# Patient Record
Sex: MALE | Race: BLACK OR AFRICAN AMERICAN | Employment: UNEMPLOYED | URBAN - METROPOLITAN AREA
[De-identification: names, ages, dates, MRNs, and addresses within clinical notes are randomized per-mention and may not be internally consistent; named-entity substitution may affect disease eponyms.]

---

## 2017-01-23 ENCOUNTER — HOSPITAL ENCOUNTER (OUTPATIENT)
Dept: LAB | Age: 62
Discharge: HOME | End: 2017-01-23

## 2017-01-26 RX ORDER — AMLODIPINE AND VALSARTAN 10; 320 MG/1; MG/1
1 TABLET ORAL DAILY
COMMUNITY

## 2017-01-26 RX ORDER — ASPIRIN 81 MG/1
81 TABLET ORAL DAILY
COMMUNITY

## 2017-01-26 RX ORDER — ALBUTEROL SULFATE 90 UG/1
2 AEROSOL, METERED RESPIRATORY (INHALATION)
COMMUNITY

## 2017-01-26 RX ORDER — COLCHICINE 0.6 MG/1
0.6 TABLET ORAL DAILY
COMMUNITY

## 2017-01-26 RX ORDER — WARFARIN SODIUM 5 MG/1
5 TABLET ORAL DAILY
COMMUNITY

## 2017-01-26 RX ORDER — BISOPROLOL FUMARATE 10 MG/1
10 TABLET ORAL
COMMUNITY

## 2017-01-26 RX ORDER — ROSUVASTATIN CALCIUM 10 MG/1
10 TABLET, COATED ORAL
COMMUNITY

## 2017-01-26 RX ORDER — HYDROCHLOROTHIAZIDE 25 MG/1
25 TABLET ORAL DAILY
COMMUNITY

## 2017-01-26 RX ORDER — MOMETASONE FUROATE 50 UG/1
2 SPRAY, METERED NASAL AS NEEDED
COMMUNITY

## 2017-01-31 ENCOUNTER — ANESTHESIA EVENT (OUTPATIENT)
Dept: SURGERY | Age: 62
End: 2017-01-31
Payer: MEDICARE

## 2017-02-01 ENCOUNTER — ANESTHESIA (OUTPATIENT)
Dept: SURGERY | Age: 62
End: 2017-02-01
Payer: MEDICARE

## 2017-02-01 ENCOUNTER — HOSPITAL ENCOUNTER (OUTPATIENT)
Age: 62
Setting detail: OUTPATIENT SURGERY
Discharge: HOME | End: 2017-02-01
Attending: UROLOGY | Admitting: UROLOGY
Payer: MEDICARE

## 2017-02-01 VITALS
WEIGHT: 290 LBS | RESPIRATION RATE: 15 BRPM | HEART RATE: 75 BPM | DIASTOLIC BLOOD PRESSURE: 82 MMHG | TEMPERATURE: 98.7 F | BODY MASS INDEX: 36.06 KG/M2 | SYSTOLIC BLOOD PRESSURE: 123 MMHG | OXYGEN SATURATION: 99 % | HEIGHT: 75 IN

## 2017-02-01 LAB
BUN BLD-MCNC: 18 MG/DL (ref 7–18)
CHLORIDE BLD-SCNC: 104 MMOL/L (ref 100–108)
GLUCOSE BLD STRIP.AUTO-MCNC: 89 MG/DL (ref 74–106)
HCT VFR BLD CALC: 45 % (ref 36–49)
HGB BLD-MCNC: 15.3 G/DL (ref 12–16)
POTASSIUM BLD-SCNC: 4.5 MMOL/L (ref 3.5–5.5)
SODIUM BLD-SCNC: 139 MMOL/L (ref 136–145)

## 2017-02-01 PROCEDURE — 82947 ASSAY GLUCOSE BLOOD QUANT: CPT

## 2017-02-01 PROCEDURE — 74011250636 HC RX REV CODE- 250/636

## 2017-02-01 PROCEDURE — 74011250636 HC RX REV CODE- 250/636: Performed by: NURSE ANESTHETIST, CERTIFIED REGISTERED

## 2017-02-01 PROCEDURE — 74011250637 HC RX REV CODE- 250/637

## 2017-02-01 PROCEDURE — 77030012510 HC MSK AIRWY LMA TELE -B: Performed by: NURSE ANESTHETIST, CERTIFIED REGISTERED

## 2017-02-01 RX ORDER — FAMOTIDINE 20 MG/1
20 TABLET, FILM COATED ORAL ONCE
Status: DISCONTINUED | OUTPATIENT
Start: 2017-02-02 | End: 2017-02-01 | Stop reason: HOSPADM

## 2017-02-01 RX ORDER — INSULIN LISPRO 100 [IU]/ML
INJECTION, SOLUTION INTRAVENOUS; SUBCUTANEOUS ONCE
Status: DISCONTINUED | OUTPATIENT
Start: 2017-02-02 | End: 2017-02-01 | Stop reason: HOSPADM

## 2017-02-01 RX ORDER — FAMOTIDINE 20 MG/1
TABLET, FILM COATED ORAL
Status: COMPLETED
Start: 2017-02-01 | End: 2017-02-01

## 2017-02-01 RX ORDER — SODIUM CHLORIDE, SODIUM LACTATE, POTASSIUM CHLORIDE, CALCIUM CHLORIDE 600; 310; 30; 20 MG/100ML; MG/100ML; MG/100ML; MG/100ML
25 INJECTION, SOLUTION INTRAVENOUS CONTINUOUS
Status: DISCONTINUED | OUTPATIENT
Start: 2017-02-02 | End: 2017-02-01 | Stop reason: HOSPADM

## 2017-02-01 RX ORDER — SODIUM CHLORIDE 0.9 % (FLUSH) 0.9 %
5-10 SYRINGE (ML) INJECTION AS NEEDED
Status: DISCONTINUED | OUTPATIENT
Start: 2017-02-01 | End: 2017-02-01 | Stop reason: HOSPADM

## 2017-02-01 RX ORDER — SODIUM CHLORIDE 0.9 % (FLUSH) 0.9 %
5-10 SYRINGE (ML) INJECTION EVERY 8 HOURS
Status: DISCONTINUED | OUTPATIENT
Start: 2017-02-01 | End: 2017-02-01 | Stop reason: HOSPADM

## 2017-02-01 RX ORDER — CEFAZOLIN SODIUM 2 G/50ML
2 SOLUTION INTRAVENOUS
Status: DISCONTINUED | OUTPATIENT
Start: 2017-02-01 | End: 2017-02-01 | Stop reason: HOSPADM

## 2017-02-01 RX ADMIN — SODIUM CHLORIDE, SODIUM LACTATE, POTASSIUM CHLORIDE, AND CALCIUM CHLORIDE 25 ML/HR: 600; 310; 30; 20 INJECTION, SOLUTION INTRAVENOUS at 08:38

## 2017-02-01 RX ADMIN — FAMOTIDINE 20 MG: 20 TABLET ORAL at 08:44

## 2017-02-01 NOTE — PROGRESS NOTES
Progress Note:    Pt has only been off his Warfarin and ASA for 2 days. With this procedure he is at risk for periurethral and perineal bleeding. At this time we are postponing his procedure until Monday at 130 pm.  He and his family are upset but understand the situation.     Karina Guerra MD

## 2017-02-03 ENCOUNTER — ANESTHESIA EVENT (OUTPATIENT)
Dept: SURGERY | Age: 62
End: 2017-02-03
Payer: MEDICARE

## 2017-02-06 ENCOUNTER — ANESTHESIA (OUTPATIENT)
Dept: SURGERY | Age: 62
End: 2017-02-06
Payer: MEDICARE

## 2017-02-06 ENCOUNTER — SURGERY (OUTPATIENT)
Age: 62
End: 2017-02-06

## 2017-02-06 ENCOUNTER — APPOINTMENT (OUTPATIENT)
Dept: GENERAL RADIOLOGY | Age: 62
End: 2017-02-06
Attending: UROLOGY
Payer: MEDICARE

## 2017-02-06 ENCOUNTER — HOSPITAL ENCOUNTER (OUTPATIENT)
Age: 62
Setting detail: OUTPATIENT SURGERY
Discharge: HOME | End: 2017-02-06
Attending: UROLOGY | Admitting: UROLOGY
Payer: MEDICARE

## 2017-02-06 VITALS
HEIGHT: 75 IN | HEART RATE: 66 BPM | OXYGEN SATURATION: 100 % | WEIGHT: 290 LBS | DIASTOLIC BLOOD PRESSURE: 91 MMHG | SYSTOLIC BLOOD PRESSURE: 130 MMHG | BODY MASS INDEX: 36.06 KG/M2 | RESPIRATION RATE: 13 BRPM | TEMPERATURE: 97.6 F

## 2017-02-06 PROBLEM — C61 PROSTATE CANCER (HCC): Status: ACTIVE | Noted: 2017-02-06

## 2017-02-06 PROBLEM — M19.90 OSTEOARTHRITIS: Status: ACTIVE | Noted: 2017-02-06

## 2017-02-06 PROBLEM — I50.9 CHF (CONGESTIVE HEART FAILURE) (HCC): Status: ACTIVE | Noted: 2017-02-06

## 2017-02-06 PROBLEM — K63.5 COLON POLYP: Status: ACTIVE | Noted: 2017-02-06

## 2017-02-06 PROBLEM — I48.91 A-FIB (HCC): Status: ACTIVE | Noted: 2017-02-06

## 2017-02-06 LAB
ATRIAL RATE: 113 BPM
CALCULATED R AXIS, ECG10: 8 DEGREES
CALCULATED T AXIS, ECG11: -7 DEGREES
DIAGNOSIS, 93000: NORMAL
Q-T INTERVAL, ECG07: 392 MS
QRS DURATION, ECG06: 86 MS
QTC CALCULATION (BEZET), ECG08: 443 MS
VENTRICULAR RATE, ECG03: 77 BPM

## 2017-02-06 PROCEDURE — 74011000250 HC RX REV CODE- 250: Performed by: UROLOGY

## 2017-02-06 PROCEDURE — 93005 ELECTROCARDIOGRAM TRACING: CPT

## 2017-02-06 PROCEDURE — 74011250636 HC RX REV CODE- 250/636

## 2017-02-06 PROCEDURE — 76060000032 HC ANESTHESIA 0.5 TO 1 HR: Performed by: UROLOGY

## 2017-02-06 PROCEDURE — 77030020269 HC MISC IMPL: Performed by: UROLOGY

## 2017-02-06 PROCEDURE — 74011250636 HC RX REV CODE- 250/636: Performed by: UROLOGY

## 2017-02-06 PROCEDURE — 74011250637 HC RX REV CODE- 250/637: Performed by: NURSE ANESTHETIST, CERTIFIED REGISTERED

## 2017-02-06 PROCEDURE — 74011250636 HC RX REV CODE- 250/636: Performed by: NURSE ANESTHETIST, CERTIFIED REGISTERED

## 2017-02-06 PROCEDURE — 76010000138 HC OR TIME 0.5 TO 1 HR: Performed by: UROLOGY

## 2017-02-06 PROCEDURE — 77030012510 HC MSK AIRWY LMA TELE -B: Performed by: ANESTHESIOLOGY

## 2017-02-06 PROCEDURE — 74011000250 HC RX REV CODE- 250

## 2017-02-06 PROCEDURE — A4648 IMPLANTABLE TISSUE MARKER: HCPCS | Performed by: UROLOGY

## 2017-02-06 PROCEDURE — 77030032490 HC SLV COMPR SCD KNE COVD -B: Performed by: UROLOGY

## 2017-02-06 PROCEDURE — 76210000006 HC OR PH I REC 0.5 TO 1 HR: Performed by: UROLOGY

## 2017-02-06 PROCEDURE — 77030018846 HC SOL IRR STRL H20 ICUM -A: Performed by: UROLOGY

## 2017-02-06 PROCEDURE — 76210000020 HC REC RM PH II FIRST 0.5 HR: Performed by: UROLOGY

## 2017-02-06 PROCEDURE — A4649 SURGICAL SUPPLIES: HCPCS | Performed by: UROLOGY

## 2017-02-06 DEVICE — SPACER RECTAL HYDRGEL 10ML -- SPACEOAR: Type: IMPLANTABLE DEVICE | Site: PERINEUM | Status: FUNCTIONAL

## 2017-02-06 RX ORDER — DOCUSATE SODIUM 100 MG/1
100 CAPSULE, LIQUID FILLED ORAL 2 TIMES DAILY
Qty: 60 CAP | Refills: 0 | Status: SHIPPED | OUTPATIENT
Start: 2017-02-06 | End: 2017-03-08

## 2017-02-06 RX ORDER — CEPHALEXIN 500 MG/1
500 CAPSULE ORAL 3 TIMES DAILY
Qty: 9 CAP | Refills: 0 | Status: SHIPPED | OUTPATIENT
Start: 2017-02-06 | End: 2017-02-09

## 2017-02-06 RX ORDER — MIDAZOLAM HYDROCHLORIDE 1 MG/ML
INJECTION, SOLUTION INTRAMUSCULAR; INTRAVENOUS AS NEEDED
Status: DISCONTINUED | OUTPATIENT
Start: 2017-02-06 | End: 2017-02-06 | Stop reason: HOSPADM

## 2017-02-06 RX ORDER — FENTANYL CITRATE 50 UG/ML
25 INJECTION, SOLUTION INTRAMUSCULAR; INTRAVENOUS AS NEEDED
Status: DISCONTINUED | OUTPATIENT
Start: 2017-02-06 | End: 2017-02-06 | Stop reason: HOSPADM

## 2017-02-06 RX ORDER — FAMOTIDINE 20 MG/1
20 TABLET, FILM COATED ORAL ONCE
Status: COMPLETED | OUTPATIENT
Start: 2017-02-06 | End: 2017-02-06

## 2017-02-06 RX ORDER — FENTANYL CITRATE 50 UG/ML
INJECTION, SOLUTION INTRAMUSCULAR; INTRAVENOUS AS NEEDED
Status: DISCONTINUED | OUTPATIENT
Start: 2017-02-06 | End: 2017-02-06 | Stop reason: HOSPADM

## 2017-02-06 RX ORDER — HYDROMORPHONE HYDROCHLORIDE 2 MG/ML
0.5 INJECTION, SOLUTION INTRAMUSCULAR; INTRAVENOUS; SUBCUTANEOUS
Status: DISCONTINUED | OUTPATIENT
Start: 2017-02-06 | End: 2017-02-06 | Stop reason: HOSPADM

## 2017-02-06 RX ORDER — SODIUM CHLORIDE, SODIUM LACTATE, POTASSIUM CHLORIDE, CALCIUM CHLORIDE 600; 310; 30; 20 MG/100ML; MG/100ML; MG/100ML; MG/100ML
100 INJECTION, SOLUTION INTRAVENOUS CONTINUOUS
Status: DISCONTINUED | OUTPATIENT
Start: 2017-02-06 | End: 2017-02-06 | Stop reason: HOSPADM

## 2017-02-06 RX ORDER — PROPOFOL 10 MG/ML
INJECTION, EMULSION INTRAVENOUS AS NEEDED
Status: DISCONTINUED | OUTPATIENT
Start: 2017-02-06 | End: 2017-02-06 | Stop reason: HOSPADM

## 2017-02-06 RX ORDER — LIDOCAINE HYDROCHLORIDE 10 MG/ML
INJECTION INFILTRATION; PERINEURAL AS NEEDED
Status: DISCONTINUED | OUTPATIENT
Start: 2017-02-06 | End: 2017-02-06 | Stop reason: HOSPADM

## 2017-02-06 RX ORDER — ONDANSETRON 2 MG/ML
INJECTION INTRAMUSCULAR; INTRAVENOUS AS NEEDED
Status: DISCONTINUED | OUTPATIENT
Start: 2017-02-06 | End: 2017-02-06 | Stop reason: HOSPADM

## 2017-02-06 RX ORDER — SODIUM CHLORIDE, SODIUM LACTATE, POTASSIUM CHLORIDE, CALCIUM CHLORIDE 600; 310; 30; 20 MG/100ML; MG/100ML; MG/100ML; MG/100ML
25 INJECTION, SOLUTION INTRAVENOUS CONTINUOUS
Status: DISCONTINUED | OUTPATIENT
Start: 2017-02-06 | End: 2017-02-06 | Stop reason: HOSPADM

## 2017-02-06 RX ORDER — CEFAZOLIN SODIUM 2 G/50ML
2 SOLUTION INTRAVENOUS
Status: COMPLETED | OUTPATIENT
Start: 2017-02-06 | End: 2017-02-06

## 2017-02-06 RX ORDER — GLYCOPYRROLATE 0.2 MG/ML
INJECTION INTRAMUSCULAR; INTRAVENOUS AS NEEDED
Status: DISCONTINUED | OUTPATIENT
Start: 2017-02-06 | End: 2017-02-06 | Stop reason: HOSPADM

## 2017-02-06 RX ORDER — ALBUTEROL SULFATE 0.83 MG/ML
2.5 SOLUTION RESPIRATORY (INHALATION)
Status: DISCONTINUED | OUTPATIENT
Start: 2017-02-06 | End: 2017-02-06 | Stop reason: HOSPADM

## 2017-02-06 RX ORDER — LIDOCAINE HYDROCHLORIDE 20 MG/ML
INJECTION, SOLUTION EPIDURAL; INFILTRATION; INTRACAUDAL; PERINEURAL AS NEEDED
Status: DISCONTINUED | OUTPATIENT
Start: 2017-02-06 | End: 2017-02-06 | Stop reason: HOSPADM

## 2017-02-06 RX ORDER — OXYCODONE AND ACETAMINOPHEN 5; 325 MG/1; MG/1
1-2 TABLET ORAL
Qty: 8 TAB | Refills: 0 | Status: SHIPPED | OUTPATIENT
Start: 2017-02-06

## 2017-02-06 RX ADMIN — SODIUM CHLORIDE, SODIUM LACTATE, POTASSIUM CHLORIDE, AND CALCIUM CHLORIDE 25 ML/HR: 600; 310; 30; 20 INJECTION, SOLUTION INTRAVENOUS at 11:52

## 2017-02-06 RX ADMIN — PROPOFOL 160 MG: 10 INJECTION, EMULSION INTRAVENOUS at 13:31

## 2017-02-06 RX ADMIN — MIDAZOLAM HYDROCHLORIDE 2 MG: 1 INJECTION, SOLUTION INTRAMUSCULAR; INTRAVENOUS at 13:23

## 2017-02-06 RX ADMIN — LIDOCAINE HYDROCHLORIDE 10 ML: 10 INJECTION, SOLUTION INFILTRATION; PERINEURAL at 13:48

## 2017-02-06 RX ADMIN — FENTANYL CITRATE 75 MCG: 50 INJECTION, SOLUTION INTRAMUSCULAR; INTRAVENOUS at 13:30

## 2017-02-06 RX ADMIN — PROPOFOL 40 MG: 10 INJECTION, EMULSION INTRAVENOUS at 13:42

## 2017-02-06 RX ADMIN — ONDANSETRON 4 MG: 2 INJECTION INTRAMUSCULAR; INTRAVENOUS at 13:23

## 2017-02-06 RX ADMIN — CEFAZOLIN SODIUM 2 G: 2 SOLUTION INTRAVENOUS at 13:30

## 2017-02-06 RX ADMIN — FAMOTIDINE 20 MG: 20 TABLET ORAL at 11:08

## 2017-02-06 RX ADMIN — FENTANYL CITRATE 25 MCG: 50 INJECTION, SOLUTION INTRAMUSCULAR; INTRAVENOUS at 13:26

## 2017-02-06 RX ADMIN — LIDOCAINE HYDROCHLORIDE 60 MG: 20 INJECTION, SOLUTION EPIDURAL; INFILTRATION; INTRACAUDAL; PERINEURAL at 13:31

## 2017-02-06 RX ADMIN — GLYCOPYRROLATE 0.2 MG: 0.2 INJECTION INTRAMUSCULAR; INTRAVENOUS at 13:23

## 2017-02-06 NOTE — ANESTHESIA PREPROCEDURE EVALUATION
Anesthetic History   No history of anesthetic complications            Review of Systems / Medical History  Patient summary reviewed and pertinent labs reviewed    Pulmonary        Sleep apnea: CPAP  Shortness of breath  Asthma        Neuro/Psych   Within defined limits           Cardiovascular    Hypertension        Dysrhythmias : atrial fibrillation      Exercise tolerance: >4 METS     GI/Hepatic/Renal  Within defined limits              Endo/Other        Arthritis     Other Findings   Comments: Current Smoker? NO       Elective Surgery? Yes       Abstained from smoking 24 hours prior to anesthesia? N/A    Risk Factors for Postoperative nausea/vomiting:       History of postoperative nausea/vomiting? NO       Female? NO       Motion sickness? NO       Intended opioid administration for postoperative analgesia?   YES           Physical Exam    Airway  Mallampati: II  TM Distance: 4 - 6 cm  Neck ROM: normal range of motion   Mouth opening: Normal     Cardiovascular    Rhythm: irregular  Rate: normal         Dental  No notable dental hx       Pulmonary  Breath sounds clear to auscultation               Abdominal  GI exam deferred       Other Findings            Anesthetic Plan    ASA: 3  Anesthesia type: general          Induction: Intravenous  Anesthetic plan and risks discussed with: Patient

## 2017-02-06 NOTE — BRIEF OP NOTE
BRIEF OPERATIVE NOTE    Date of Procedure: 2/6/2017   Preoperative Diagnosis: c61 prostate cancer  Postoperative Diagnosis: c61 prostate cancer    Procedure(s): FIDUCIAL seed  MARKERs with space OAR gel placement  Surgeon(s) and Role:     * Jessica Mathew MD - Primary            Surgical Staff:  Circ-1: Lincoln Bar RN  Radiology Technician: Sedrick Arriola  Scrub Tech-1: Amber Cordova  Event Time In   Incision Start  1:40 PM   Incision Close  1:59 PM     Anesthesia: General   Estimated Blood Loss: minimal  Specimens: none  Findings: medium prostate implantedwith 3 fiducial seed markers in usual triangular fashion. SpaceOAR implanted in midline with good rectal separation from prostate. Complications: none  Implants: 3 Polycarbon fiducial seed markers implanted. 10cc of SpaceOAR implanted.     Jessica Mathew MD

## 2017-02-06 NOTE — H&P
History and Physical        Patient: Walter Hamilton               Sex: male          DOA: (Not on file)         YOB: 1955      Age:  59 y.o.        LOS:  LOS: 0 days        HPI:     Walter Hamilton is a 59 y.o. male who is admitted today for Fiducial seed marker implant and SpaceOAR hydrogel implant. He was found to have Laurel's 7 (4+3) in 6 of 12 cores of a biopsy This was done as his PSA was 7.7. He has elected to proceed with Proton Therapy and now presents for above. He has been on Coumadin and ASA for his Afib and has been off this for at least 4 days. Past Medical History   Diagnosis Date    Adverse effect of anesthesia 2008     LUNG COLLAPSED POST KNEE SX    Arthritis      FEET & HANDS    Asthma     Atrial fibrillation ([address]     Cancer [address] [address]      PROSTATE    Hypertension     Sleep apnea      CPAP    Vertigo    Prostate cancer    Past Surgical History   Procedure Laterality Date    Hx knee replacement Bilateral     Hx urological       PROSTATE BX    Hx colonoscopy         History reviewed. No pertinent family history. Social History     Social History    Marital status:      Spouse name: N/A    Number of children: N/A    Years of education: N/A     Social History Main Topics    Smoking status: Never Smoker    Smokeless tobacco: Never Used    Alcohol use Yes      Comment: occasionally    Drug use: No    Sexual activity: Not Asked     Other Topics Concern    None     Social History Narrative       Prior to Admission medications    Medication Sig Start Date End Date Taking? Authorizing Provider   warfarin (COUMADIN) 5 mg tablet Take 5 mg by mouth daily. Yes Historical Provider   aspirin delayed-release 81 mg tablet Take 81 mg by mouth daily. Yes Historical Provider   amLODIPine-valsartan (EXFORGE)  mg per tablet Take 1 Tab by mouth daily. Yes Historical Provider   bisoprolol (ZEBETA) 10 mg tablet Take 10 mg by mouth nightly.    Yes Historical Provider   rosuvastatin (CRESTOR) 10 mg tablet Take 10 mg by mouth nightly. Yes Historical Provider   mometasone (NASONEX) 50 mcg/actuation nasal spray 2 Sprays as needed. Yes Historical Provider   hydroCHLOROthiazide (HYDRODIURIL) 25 mg tablet Take 25 mg by mouth daily. Yes Historical Provider   albuterol (PROAIR HFA) 90 mcg/actuation inhaler Take 2 Puffs by inhalation every four (4) hours as needed for Wheezing. Yes Historical Provider   mometasone-formoterol (DULERA) 100-5 mcg/actuation HFA inhaler Take 2 Puffs by inhalation two (2) times a day. Yes Historical Provider   CETIRIZINE HCL (CETIRIZINE PO) Take  by mouth. Yes Historical Provider   colchicine 0.6 mg tablet Take 0.6 mg by mouth daily. Yes Historical Provider       No Known Allergies    Review of Systems    Constitutional: Fever: no  Skin: Rash: no  HEENT: Hearing difficulty: no  Eyes: Blurred vision: no  Cardiovascular: Chest pain: no  Respiratory: Shortness of breath: no  Gastrointestinal: Nausea/vomiting: no  Musculoskeletal: Back pain: no  Neurological: Weakness: no  Psychological: Memory loss: no  Comments/additional findings:       Physical Exam:      Visit Vitals    Ht 6' 2.5\" (1.892 m)    Wt 290 lb (131.5 kg)    BMI 36.74 kg/m2       Physical Exam:  Visit Vitals    Ht 6' 2.5\" (1.892 m)    Wt 290 lb (131.5 kg)    BMI 36.74 kg/m2     Constitutional: WDWN, Pleasant and appropriate affect, No acute distress. CV:  No peripheral swelling noted, regularly irregular  Respiratory: No respiratory distress or difficulties, CTAB. Abdomen:  No abdominal masses or tenderness. No CVA tenderness. No hernias noted.  Male:    SARA:Perineum normal to visual inspection, no erythema or irritation, Sphincter with good tone, Rectum with no hemorrhoids, fissures or masses. Prostate is moderate  SCROTUM:  No scrotal rash or lesions noticed. Normal bilateral testes and epididymis. PENIS: Urethral meatus normal in location and size.  No urethral discharge. Skin: No jaundice. Neuro/Psych:  Alert and oriented x 3. Affect appropriate. Lymphatic:   No enlarged inguinal lymph nodes. Labs Reviewed:    Results for orders placed or performed during the hospital encounter of 02/01/17   POC 6 PLUS   Result Value Ref Range    Sodium (POC) 139 136 - 145 MMOL/L    Potassium (POC) 4.5 3.5 - 5.5 MMOL/L    Chloride (POC) 104 100 - 108 MMOL/L    BUN (POC) 18 7 - 18 MG/DL    Glucose (POC) 89 74 - 106 MG/DL    Hematocrit (POC) 45 36 - 49 %    Hemoglobin (POC) 15.3 12 - 16 G/DL         Assessment/Plan     Active Problems:    Prostate cancer ([facility] [address] (2/6/2017)      Plan:  Patient presents today for Fiducial marker seed implant and SpaceOAR hydrogel implant. There has been a complete discussion of the details these procedures and he understands them. He has been off his Coumadin for at least 4 days.     Lori Pino MD

## 2017-02-06 NOTE — ANESTHESIA POSTPROCEDURE EVALUATION
Post-Anesthesia Evaluation & Assessment    Visit Vitals    /78    Pulse 83    Temp 36.4 °C (97.6 °F)    Resp 19    Ht 6' 2.5\" (1.892 m)    Wt 131.5 kg (290 lb)    SpO2 97%    BMI 36.74 kg/m2       Nausea/Vomiting: no nausea and no vomiting    Post-operative hydration adequate. Pain score (VAS): 0    Mental status & Level of consciousness: alert and oriented x 3    Neurological status: moves all extremities, sensation grossly intact    Pulmonary status: airway patent, no supplemental oxygen required    Complications related to anesthesia: none    Patient has met all discharge requirements. Additional comments:        Baljinder Conner CRNA  February 6, 2017Post-Anesthesia Evaluation and Assessment    Patient: Gary Levy MRN: [id number]  SSN: [id number]    YOB: 1955  Age: 59 y.o. Sex: male      Data from PACU flowsheet    Cardiovascular Function/Vital Signs  Visit Vitals    /78    Pulse 83    Temp 36.4 °C (97.6 °F)    Resp 19    Ht 6' 2.5\" (1.892 m)    Wt 131.5 kg (290 lb)    SpO2 97%    BMI 36.74 kg/m2       Patient is status post general anesthesia for Procedure(s): FIDUCIAL seed  MARKERs with space OAR gel placement. Nausea/Vomiting: controlled    Postoperative hydration reviewed and adequate. Pain:  Pain Scale 1: Numeric (0 - 10) (02/06/17 1424)  Pain Intensity 1: 0 (02/06/17 1424)   Managed      Mental Status and Level of Consciousness: Alert and oriented     Pulmonary Status:   O2 Device: Room air (02/06/17 1424)   Adequate oxygenation and airway patent    Complications related to anesthesia: None    Post-anesthesia assessment completed.  No concerns    Signed By: Baljinder Conner CRNA     February 6, 2017

## 2017-02-06 NOTE — OP NOTES
OPERATIVE NOTE     Date of Procedure: 2/6/2017     Preoperative Diagnosis: c61 prostate cancer  Postoperative Diagnosis: c61 prostate cancer     Procedure(s): FIDUCIAL seed MARKERs with space OAR gel placement    Surgeon(s) and Role:  * Raúl Fleming MD - Primary      Surgical Staff:  Circ-1: Carlyn Espinal RN  Radiology Technician: Spike Knight  Scrub Tech-1: Celestine Erickson    Event Time In   Incision Start 1:40 PM   Incision Close 1:59 PM      Anesthesia: General     Estimated Blood Loss: minimal    Specimens: none    Findings: 38 gram prostate implantedwith 3 fiducial seed markers in usual triangular fashion. SpaceOAR implanted in midline with good rectal separation from prostate. Complications: none    Implants: 3 Polycarbon fiducial seed markers implanted. 10cc of SpaceOAR implanted. Indications: See History and Physical please.            Procedure: The patient was placed in lithotomy position. A surgical time out was performed and all were in agreement. Elizabet-operative antibiotics and Sequential compression devices were in place. A digital rectal exam was performed which revealed 38 g prostate with no nodules. An endorectal probe mounted to the surgical table was placed in the rectum. Anesthesia: Prostate Block - 1% Lidocaine was injected in an appropriate fashion in the perineum. Three needles were placed into the perineum under ultrasound guidance and three polycarbon fiducial markers were placed into the right base, right apex and left lateral prostate gland. All needles were removed. The probe was removed and the patient was observed. Fluoroscopy images of the pelvis was performed confirming appropriate placement of the seeds.       After completion of fiducial marker seed implant. SpaceOAR hydrogel was implanted in the following manner. The supplied injection needle was attached to a syringe filled with 5 cc of normal saline.  Under ultrasound guidance the needle was placed in the midline near the median raphe of the perineum below the area of the bulbous urethra. The needle was progressed above the anal verge and placed between Denonvierre's fascia and perirectal fat. The potential space was developed and the proper needle position confirmed by injecting at least 2-3 cc of saline. With the needle in the appropriate position 10cc of SpaceOAR hydrogel was implanted in this area with slow constant pressure. Ultrasound imaging first in the longitudinal plane then in sagittal confirmed good positioning of the SpaceOAR hydrogel.       Bacitracin was placed along the perineum.  The patient was returned to the supine position and was transferred to the recovery bed thus concluding the procedure  Refugio Churchill MD  [facility]

## 2017-02-06 NOTE — IP AVS SNAPSHOT
[facility] 
 
 
 [address] 
[phone] Patient: Mancil Bloch MRN: [id number] DZN:[id number]/6/2485 You are allergic to the following No active allergies Recent Documentation Height Weight BMI Smoking Status 1.892 m 131.5 kg 36.74 kg/m2 Never Smoker Emergency Contacts Name Discharge Info Relation Home Work Mobile Brina Leal DISCHARGE CAREGIVER [3] Daughter [21]   [phone] Lorri Church CAREGIVER [3] Daughter [21] [phone] About your hospitalization You were admitted on:  February 6, 2017 You last received care in the:  [facility] PHASE 2 RECOVERY You were discharged on:  February 6, 2017 Unit phone number:  [phone] Why you were hospitalized Your primary diagnosis was:  Not on File Your diagnoses also included:  Prostate Cancer (Hcc) Providers Seen During Your Hospitalizations Provider Role Specialty Primary office phone Billie Mendes MD Attending Provider Urology [phone] Your Primary Care Physician (PCP) Primary Care Physician Office Phone Office Fax OTHER, PHYS ** None ** ** None ** Follow-up Information Follow up With Details Comments Contact Info Phys Holly, MD   Patient can only remember the practice name and not the physician Current Discharge Medication List  
  
START taking these medications Dose & Instructions Dispensing Information Comments Morning Noon Evening Bedtime  
 cephALEXin 500 mg capsule Commonly known as:  Davidendlykarla Campi Your next dose is: Today, Tomorrow Other:  _________ Dose:  500 mg Take 1 Cap by mouth three (3) times daily for 3 days. Quantity:  9 Cap Refills:  0  
     
   
   
   
  
 docusate sodium 100 mg capsule Commonly known as:  Henreitta Cranston Your next dose is: Today, Tomorrow Other:  _________ Dose:  100 mg Take 1 Cap by mouth two (2) times a day for 30 days. Quantity:  60 Cap Refills:  0  
     
   
   
   
  
 oxyCODONE-acetaminophen 5-325 mg per tablet Commonly known as:  PERCOCET Your next dose is: Today, Tomorrow Other:  _________ Dose:  1-2 Tab Take 1-2 Tabs by mouth every six (6) hours as needed for Pain. Max Daily Amount: 8 Tabs. Quantity:  8 Tab Refills:  0 CONTINUE these medications which have NOT CHANGED Dose & Instructions Dispensing Information Comments Morning Noon Evening Bedtime  
 amLODIPine-valsartan  mg per tablet Commonly known as:  Mariana Acron Your next dose is: Today, Tomorrow Other:  _________ Dose:  1 Tab Take 1 Tab by mouth daily. Refills:  0  
     
   
   
   
  
 aspirin delayed-release 81 mg tablet Your next dose is: Today, Tomorrow Other:  _________ Dose:  81 mg Take 81 mg by mouth daily. Refills:  0  
     
   
   
   
  
 bisoprolol 10 mg tablet Commonly known as:  Aislinn Leopard Your next dose is: Today, Tomorrow Other:  _________ Dose:  10 mg Take 10 mg by mouth nightly. Refills:  0 CETIRIZINE PO Your next dose is: Today, Tomorrow Other:  _________ Take  by mouth. Refills:  0  
     
   
   
   
  
 colchicine 0.6 mg tablet Your next dose is: Today, Tomorrow Other:  _________ Dose:  0.6 mg Take 0.6 mg by mouth daily. Refills:  0  
     
   
   
   
  
 CRESTOR 10 mg tablet Generic drug:  rosuvastatin Your next dose is: Today, Tomorrow Other:  _________ Dose:  10 mg Take 10 mg by mouth nightly. Refills:  0 DULERA 100-5 mcg/actuation HFA inhaler Generic drug:  mometasone-formoterol Your next dose is: Today, Tomorrow Other:  _________ Dose:  2 Puff Take 2 Puffs by inhalation two (2) times a day. Refills:  0  
     
   
   
   
  
 hydroCHLOROthiazide 25 mg tablet Commonly known as:  HYDRODIURIL Your next dose is: Today, Tomorrow Other:  _________ Dose:  25 mg Take 25 mg by mouth daily. Refills:  0  
     
   
   
   
  
 NASONEX 50 mcg/actuation nasal spray Generic drug:  mometasone Your next dose is: Today, Tomorrow Other:  _________ Dose:  2 Spray 2 Sprays as needed. Refills:  0 PROAIR HFA 90 mcg/actuation inhaler Generic drug:  albuterol Your next dose is: Today, Tomorrow Other:  _________ Dose:  2 Puff Take 2 Puffs by inhalation every four (4) hours as needed for Wheezing. Refills:  0  
     
   
   
   
  
 warfarin 5 mg tablet Commonly known as:  COUMADIN Your next dose is: Today, Tomorrow Other:  _________ Dose:  5 mg Take 5 mg by mouth daily. Refills:  0 Where to Get Your Medications Information on where to get these meds will be given to you by the nurse or doctor. ! Ask your nurse or doctor about these medications  
  cephALEXin 500 mg capsule  
 docusate sodium 100 mg capsule  
 oxyCODONE-acetaminophen 5-325 mg per tablet Discharge Instructions DISCHARGE SUMMARY from Nurse The following personal items are in your possession at time of discharge: 
 
Dental Appliances: None Visual Aid: Glasses PATIENT INSTRUCTIONS: 
 
After general anesthesia or intravenous sedation, for 24 hours or while taking prescription Narcotics: · Limit your activities · Do not drive and operate hazardous machinery · Do not make important personal or business decisions · Do  not drink alcoholic beverages · If you have not urinated within 8 hours after discharge, please contact your surgeon on call. Report the following to your surgeon: 
· Excessive pain, swelling, redness or odor of or around the surgical area · Temperature over 100.5 · Nausea and vomiting lasting longer than 4 hours or if unable to take medications · Any signs of decreased circulation or nerve impairment to extremity: change in color, persistent  numbness, tingling, coldness or increase pain · Any questions What to do at Home: No smoking/ No tobacco products/ Avoid exposure to second hand smoke Surgeon General's Warning:  Quitting smoking now greatly reduces serious risk to your health. Obesity, smoking, and sedentary lifestyle greatly increases your risk for illness A healthy diet, regular physical exercise & weight monitoring are important for maintaining a healthy lifestyle You may be retaining fluid if you have a history of heart failure or if you experience any of the following symptoms:  Weight gain of 3 pounds or more overnight or 5 pounds in a week, increased swelling in our hands or feet or shortness of breath while lying flat in bed. Please call your doctor as soon as you notice any of these symptoms; do not wait until your next office visit. Recognize signs and symptoms of STROKE: 
 
F-face looks uneven A-arms unable to move or move unevenly S-speech slurred or non-existent T-time-call 911 as soon as signs and symptoms begin-DO NOT go Back to bed or wait to see if you get better-TIME IS BRAIN. Warning Signs of HEART ATTACK Call 911 if you have these symptoms: 
? Chest discomfort. Most heart attacks involve discomfort in the center of the chest that lasts more than a few minutes, or that goes away and comes back. It can feel like uncomfortable pressure, squeezing, fullness, or pain. ? Discomfort in other areas of the upper body. Symptoms can include pain or discomfort in one or both arms, the back, neck, jaw, or stomach. ? Shortness of breath with or without chest discomfort. ? Other signs may include breaking out in a cold sweat, nausea, or lightheadedness. Don't wait more than five minutes to call [address]! Fast action can save your life. Calling 911 is almost always the fastest way to get lifesaving treatment. Emergency Medical Services staff can begin treatment when they arrive  up to an hour sooner than if someone gets to the hospital by car. The discharge information has been reviewed with the patient. The patient verbalized understanding. Discharge medications reviewed with the patient and appropriate educational materials and side effects teaching were provided. Patient armband removed and given to patient to take home. Patient was informed of the privacy risks if armband lost or stolen Discharge Instructions Patient: Zelalem Barajas               Sex: male          DOA: 2/6/2017 YOB: 1955      Age:  59 y.o.        LOS:  LOS: 0 days ACUTE DIAGNOSES: 
G49 prostate cancer CHRONIC MEDICAL DIAGNOSES: 
Problem List as of 2/6/2017  Date Reviewed: 2/6/2017 Codes Class Noted - Resolved Prostate cancer [facility]) ICD-10-CM: A49 ICD-9-CM: 185  2/6/2017 - Present A-fib [facility]) ICD-10-CM: I48.91 
ICD-9-CM: 427.31  2/6/2017 - Present CHF (congestive heart failure) (HCC) ICD-10-CM: I50.9 ICD-9-CM: 428.0  2/6/2017 - Present Osteoarthritis ICD-10-CM: M19.90 ICD-9-CM: 715.90  2/6/2017 - Present Colon polyp ICD-10-CM: K63.5 ICD-9-CM: 211.3  2/6/2017 - Present DISCHARGE MEDICATIONS:  
 
 
· It is important that you take the medication exactly as they are prescribed. · Keep your medication in the bottles provided by the pharmacist and keep a list of the medication names, dosages, and times to be taken in your wallet. · Do not take other medications without consulting your doctor. DIET:  Resume previous diet ACTIVITY: No lifting or Strenuous exercise for 48 hours. ADDITIONAL INFORMATION: If you experience any of the following symptoms then please call your primary care physician or return to the emergency room if you cannot get hold of your doctor: Fever, chills, nausea, vomiting, diarrhea, change in mentation, falling, bleeding, shortness of breath. FOLLOW UP CARE: 
 
Follow-up with primary urologist in about 3 months, after patient completes Proton therapy. Information obtained by : 
I understand that if any problems occur once I am at home I am to contact my physician. I understand and acknowledge receipt of the instructions indicated above. Physician's or R.N.'s Signature                                                                  Date/Time Patient or Representative Signature                                                          Date/Time Discharge Orders None Introducing [facility]! Heena Johnson introduces [employer] patient portal. Now you can access parts of your medical record, email your doctor's office, and request medication refills online. 1. In your internet browser, go to [email or web address] 2. Click on the First Time User? Click Here link in the Sign In box. You will see the New Member Sign Up page. 3. Enter your [employer] Access Code exactly as it appears below. You will not need to use this code after youve completed the sign-up process. If you do not sign up before the expiration date, you must request a new code. · [employer] Access Code: [email or web address] Expires: 4/27/2017 12:47 PM 
 
4. Enter the last four digits of your Social Security Number (xxxx) and Date of Birth (mm/dd/yyyy) as indicated and click Submit.  You will be taken to the next sign-up page. 5. Create a [employer] ID. This will be your [employer] login ID and cannot be changed, so think of one that is secure and easy to remember. 6. Create a [employer] password. You can change your password at any time. 7. Enter your Password Reset Question and Answer. This can be used at a later time if you forget your password. 8. Enter your e-mail address. You will receive e-mail notification when new information is available in [address] 9. Click Sign Up. You can now view and download portions of your medical record. 10. Click the Download Summary menu link to download a portable copy of your medical information. If you have questions, please visit the Frequently Asked Questions section of the [employer] website. Remember, [employer] is NOT to be used for urgent needs. For medical emergencies, dial 911. Now available from your iPhone and Android! General Information Please provide this summary of care documentation to your next provider. Patient Signature:  ____________________________________________________________ Date:  ____________________________________________________________  
  
Jamee Colindres Provider Signature:  ____________________________________________________________ Date:  ____________________________________________________________

## 2017-02-06 NOTE — IP AVS SNAPSHOT
Summary of Care Report The Summary of Care report has been created to help improve care coordination. Users with access to [place] or [address] (Web-based application) may access additional patient information including the Discharge Summary. If you are not currently a [address] user and need more information, please call the number listed below in the [place] [address] section and ask to be connected with Medical Records. Facility Information Name Address Phone [facility] [address] [facility] [address] [id number] [phone] Patient Information Patient Name Sex ZACH Cristobal ([id number]) Male 1955 Discharge Information Admitting Provider Service Area Unit Georgette Preciado MD / [facility] / [phone] Discharge Provider Discharge Date/Time Discharge Disposition Destination (none) 2017 (Pending) AHR (none) Patient Language Language ENGLISH [13] Problem List as of 2017  Date Reviewed: 2017 Codes Priority Class Noted - Resolved Prostate cancer [facility]) ICD-10-CM: Q53 ICD-9-CM: 185   2017 - Present A-fib [facility]) ICD-10-CM: I48.91 
ICD-9-CM: 427.31   2017 - Present CHF (congestive heart failure) (HCC) ICD-10-CM: I50.9 ICD-9-CM: 428.0   2017 - Present Osteoarthritis ICD-10-CM: M19.90 ICD-9-CM: 715.90   2017 - Present Colon polyp ICD-10-CM: K63.5 ICD-9-CM: 211.3   2017 - Present You are allergic to the following No active allergies Current Discharge Medication List  
  
START taking these medications Dose & Instructions Dispensing Information Comments  
 cephALEXin 500 mg capsule Commonly known as:  Jocelynn Torres Dose:  500 mg Take 1 Cap by mouth three (3) times daily for 3 days. Quantity:  9 Cap Refills:  0 docusate sodium 100 mg capsule Commonly known as:  Thor Márquez Dose:  100 mg Take 1 Cap by mouth two (2) times a day for 30 days. Quantity:  60 Cap Refills:  0  
   
 oxyCODONE-acetaminophen 5-325 mg per tablet Commonly known as:  PERCOCET Dose:  1-2 Tab Take 1-2 Tabs by mouth every six (6) hours as needed for Pain. Max Daily Amount: 8 Tabs. Quantity:  8 Tab Refills:  0 CONTINUE these medications which have NOT CHANGED Dose & Instructions Dispensing Information Comments  
 amLODIPine-valsartan  mg per tablet Commonly known as:  Janet José Dose:  1 Tab Take 1 Tab by mouth daily. Refills:  0  
   
 aspirin delayed-release 81 mg tablet Dose:  81 mg Take 81 mg by mouth daily. Refills:  0  
   
 bisoprolol 10 mg tablet Commonly known as:  [place] Delfin Dose:  10 mg Take 10 mg by mouth nightly. Refills:  0 CETIRIZINE PO Take  by mouth. Refills:  0  
   
 colchicine 0.6 mg tablet Dose:  0.6 mg Take 0.6 mg by mouth daily. Refills:  0  
   
 CRESTOR 10 mg tablet Generic drug:  rosuvastatin Dose:  10 mg Take 10 mg by mouth nightly. Refills:  0 DULERA 100-5 mcg/actuation HFA inhaler Generic drug:  mometasone-formoterol Dose:  2 Puff Take 2 Puffs by inhalation two (2) times a day. Refills:  0  
   
 hydroCHLOROthiazide 25 mg tablet Commonly known as:  HYDRODIURIL Dose:  25 mg Take 25 mg by mouth daily. Refills:  0  
   
 NASONEX 50 mcg/actuation nasal spray Generic drug:  mometasone Dose:  2 Spray 2 Sprays as needed. Refills:  0 PROAIR HFA 90 mcg/actuation inhaler Generic drug:  albuterol Dose:  2 Puff Take 2 Puffs by inhalation every four (4) hours as needed for Wheezing. Refills:  0  
   
 warfarin 5 mg tablet Commonly known as:  COUMADIN Dose:  5 mg Take 5 mg by mouth daily. Refills:  0 Surgery Information ID Date/Time Status Primary Surgeon All Procedures Location [id number] 2/6/2017 1245 Unposted Jessica Mathew MD FIDUCIAL seed  MARKERs with space OAR gel placement [id number] MAIN OR Follow-up Information Follow up With Details Comments Contact Info Deon Barrera MD   Patient can only remember the practice name and not the physician Discharge Instructions DISCHARGE SUMMARY from Nurse The following personal items are in your possession at time of discharge: 
 
Dental Appliances: None Visual Aid: Glasses PATIENT INSTRUCTIONS: 
 
After general anesthesia or intravenous sedation, for 24 hours or while taking prescription Narcotics: · Limit your activities · Do not drive and operate hazardous machinery · Do not make important personal or business decisions · Do  not drink alcoholic beverages · If you have not urinated within 8 hours after discharge, please contact your surgeon on call. Report the following to your surgeon: 
· Excessive pain, swelling, redness or odor of or around the surgical area · Temperature over 100.5 · Nausea and vomiting lasting longer than 4 hours or if unable to take medications · Any signs of decreased circulation or nerve impairment to extremity: change in color, persistent  numbness, tingling, coldness or increase pain · Any questions What to do at Home: No smoking/ No tobacco products/ Avoid exposure to second hand smoke Surgeon General's Warning:  Quitting smoking now greatly reduces serious risk to your health. Obesity, smoking, and sedentary lifestyle greatly increases your risk for illness A healthy diet, regular physical exercise & weight monitoring are important for maintaining a healthy lifestyle You may be retaining fluid if you have a history of heart failure or if you experience any of the following symptoms:  Weight gain of 3 pounds or more overnight or 5 pounds in a week, increased swelling in our hands or feet or shortness of breath while lying flat in bed. Please call your doctor as soon as you notice any of these symptoms; do not wait until your next office visit. Recognize signs and symptoms of STROKE: 
 
F-face looks uneven A-arms unable to move or move unevenly S-speech slurred or non-existent T-time-call 911 as soon as signs and symptoms begin-DO NOT go Back to bed or wait to see if you get better-TIME IS BRAIN. Warning Signs of HEART ATTACK Call 911 if you have these symptoms: 
? Chest discomfort. Most heart attacks involve discomfort in the center of the chest that lasts more than a few minutes, or that goes away and comes back. It can feel like uncomfortable pressure, squeezing, fullness, or pain. ? Discomfort in other areas of the upper body. Symptoms can include pain or discomfort in one or both arms, the back, neck, jaw, or stomach. ? Shortness of breath with or without chest discomfort. ? Other signs may include breaking out in a cold sweat, nausea, or lightheadedness. Don't wait more than five minutes to call [address]! Fast action can save your life. Calling 911 is almost always the fastest way to get lifesaving treatment. Emergency Medical Services staff can begin treatment when they arrive  up to an hour sooner than if someone gets to the hospital by car. The discharge information has been reviewed with the patient. The patient verbalized understanding. Discharge medications reviewed with the patient and appropriate educational materials and side effects teaching were provided. Patient armband removed and given to patient to take home. Patient was informed of the privacy risks if armband lost or stolen Discharge Instructions Patient: Jacki Esposito               Sex: male          DOA: 2/6/2017 YOB: 1955      Age:  59 y.o.        LOS:  LOS: 0 days ACUTE DIAGNOSES: 
G65 prostate cancer CHRONIC MEDICAL DIAGNOSES: 
Problem List as of 2/6/2017  Date Reviewed: 2/6/2017 Codes Class Noted - Resolved Prostate cancer [facility]) ICD-10-CM: E31 ICD-9-CM: 185  2/6/2017 - Present A-fib [facility]) ICD-10-CM: I48.91 
ICD-9-CM: 427.31  2/6/2017 - Present CHF (congestive heart failure) (HCC) ICD-10-CM: I50.9 ICD-9-CM: 428.0  2/6/2017 - Present Osteoarthritis ICD-10-CM: M19.90 ICD-9-CM: 715.90  2/6/2017 - Present Colon polyp ICD-10-CM: K63.5 ICD-9-CM: 211.3  2/6/2017 - Present DISCHARGE MEDICATIONS:  
 
 
· It is important that you take the medication exactly as they are prescribed. · Keep your medication in the bottles provided by the pharmacist and keep a list of the medication names, dosages, and times to be taken in your wallet. · Do not take other medications without consulting your doctor. DIET:  Resume previous diet ACTIVITY: No lifting or Strenuous exercise for 48 hours. ADDITIONAL INFORMATION: If you experience any of the following symptoms then please call your primary care physician or return to the emergency room if you cannot get hold of your doctor: Fever, chills, nausea, vomiting, diarrhea, change in mentation, falling, bleeding, shortness of breath. FOLLOW UP CARE: 
 
Follow-up with primary urologist in about 3 months, after patient completes Proton therapy. Information obtained by : 
I understand that if any problems occur once I am at home I am to contact my physician. I understand and acknowledge receipt of the instructions indicated above. Physician's or R.N.'s Signature                                                                  Date/Time Patient or Representative Signature                                                          Date/Time Chart Review Routing History No Routing History on File

## 2017-02-06 NOTE — PERIOP NOTES
1404  Patient received in PACU and connected to monitors. Vital signs stable. RN at bedside. Will continue to monitor.

## 2017-02-06 NOTE — DISCHARGE INSTRUCTIONS
DISCHARGE SUMMARY from Nurse    The following personal items are in your possession at time of discharge:    Dental Appliances: None  Visual Aid: Glasses                            PATIENT INSTRUCTIONS:    After general anesthesia or intravenous sedation, for 24 hours or while taking prescription Narcotics:  · Limit your activities  · Do not drive and operate hazardous machinery  · Do not make important personal or business decisions  · Do  not drink alcoholic beverages  · If you have not urinated within 8 hours after discharge, please contact your surgeon on call. Report the following to your surgeon:  · Excessive pain, swelling, redness or odor of or around the surgical area  · Temperature over 100.5  · Nausea and vomiting lasting longer than 4 hours or if unable to take medications  · Any signs of decreased circulation or nerve impairment to extremity: change in color, persistent  numbness, tingling, coldness or increase pain  · Any questions        What to do at Home:  No smoking/ No tobacco products/ Avoid exposure to second hand smoke    Surgeon General's Warning:  Quitting smoking now greatly reduces serious risk to your health. Obesity, smoking, and sedentary lifestyle greatly increases your risk for illness    A healthy diet, regular physical exercise & weight monitoring are important for maintaining a healthy lifestyle    You may be retaining fluid if you have a history of heart failure or if you experience any of the following symptoms:  Weight gain of 3 pounds or more overnight or 5 pounds in a week, increased swelling in our hands or feet or shortness of breath while lying flat in bed. Please call your doctor as soon as you notice any of these symptoms; do not wait until your next office visit.     Recognize signs and symptoms of STROKE:    F-face looks uneven    A-arms unable to move or move unevenly    S-speech slurred or non-existent    T-time-call 911 as soon as signs and symptoms begin-DO NOT go       Back to bed or wait to see if you get better-TIME IS BRAIN. Warning Signs of HEART ATTACK     Call 911 if you have these symptoms:   Chest discomfort. Most heart attacks involve discomfort in the center of the chest that lasts more than a few minutes, or that goes away and comes back. It can feel like uncomfortable pressure, squeezing, fullness, or pain.  Discomfort in other areas of the upper body. Symptoms can include pain or discomfort in one or both arms, the back, neck, jaw, or stomach.  Shortness of breath with or without chest discomfort.  Other signs may include breaking out in a cold sweat, nausea, or lightheadedness. Don't wait more than five minutes to call 911 - MINUTES MATTER! Fast action can save your life. Calling 911 is almost always the fastest way to get lifesaving treatment. Emergency Medical Services staff can begin treatment when they arrive -- up to an hour sooner than if someone gets to the hospital by car. The discharge information has been reviewed with the patient. The patient verbalized understanding. Discharge medications reviewed with the patient and appropriate educational materials and side effects teaching were provided. Patient armband removed and given to patient to take home.   Patient was informed of the privacy risks if armband lost or stolen          Discharge Instructions      Patient: Katrin Stokes               Sex: male          DOA: 2/6/2017         YOB: 1955      Age:  59 y.o.        LOS:  LOS: 0 days     ACUTE DIAGNOSES:  c61 prostate cancer    CHRONIC MEDICAL DIAGNOSES:  Problem List as of 2/6/2017  Date Reviewed: 2/6/2017          Codes Class Noted - Resolved    Prostate cancer ([facility] ICD-10-CM: C61  ICD-9-CM: 185  2/6/2017 - Present        A-fib ([facility] ICD-10-CM: I48.91  ICD-9-CM: 427.31  2/6/2017 - Present        CHF (congestive heart failure) ([facility].) ICD-10-CM: I50.9  ICD-9-CM: 428.0  2/6/2017 - Present Osteoarthritis ICD-10-CM: M19.90  ICD-9-CM: 715.90  2/6/2017 - Present        Colon polyp ICD-10-CM: K63.5  ICD-9-CM: 211.3  2/6/2017 - Present              DISCHARGE MEDICATIONS:       · It is important that you take the medication exactly as they are prescribed. · Keep your medication in the bottles provided by the pharmacist and keep a list of the medication names, dosages, and times to be taken in your wallet. · Do not take other medications without consulting your doctor. DIET:  Resume previous diet    ACTIVITY: No lifting or Strenuous exercise for 48 hours. ADDITIONAL INFORMATION: If you experience any of the following symptoms then please call your primary care physician or return to the emergency room if you cannot get hold of your doctor: Fever, chills, nausea, vomiting, diarrhea, change in mentation, falling, bleeding, shortness of breath. FOLLOW UP CARE:    Follow-up with primary urologist in about 3 months, after patient completes Proton therapy. Information obtained by :  I understand that if any problems occur once I am at home I am to contact my physician. I understand and acknowledge receipt of the instructions indicated above.                                                                                                                                            Physician's or R.N.'s Signature                                                                  Date/Time                                                                                                                                              Patient or Representative Signature                                                          Date/Time

## (undated) DEVICE — TABLE COVER: Brand: CONVERTORS

## (undated) DEVICE — SOLUTION IRRIG 1000ML H2O STRL BLT

## (undated) DEVICE — NDL PRT INJ NSAF BLNT 18GX1.5 --

## (undated) DEVICE — STERILE POLYISOPRENE POWDER-FREE SURGICAL GLOVES: Brand: PROTEXIS

## (undated) DEVICE — TEMPLATE BRACHYTHERAPY 17GA GRID DISP FOR ACCUCARE POS AND

## (undated) DEVICE — SYR 10ML CTRL LR LCK NSAF LF --

## (undated) DEVICE — KENDALL SCD EXPRESS SLEEVES, KNEE LENGTH, MEDIUM: Brand: KENDALL SCD

## (undated) DEVICE — GAUZE,SPONGE,4"X4",12PLY,STERILE,LF,2'S: Brand: MEDLINE

## (undated) DEVICE — TOWEL SURG W16XL26IN BLU NONFENESTRATED DLX ST 2 PER PK

## (undated) DEVICE — NEEDLE HYPO 25GA L1.5IN BVL ORIENTED ECLIPSE

## (undated) DEVICE — Z DISCONTINUED NO SUB IDED KIT US GEL STANDOFF